# Patient Record
Sex: MALE | Race: WHITE | NOT HISPANIC OR LATINO
[De-identification: names, ages, dates, MRNs, and addresses within clinical notes are randomized per-mention and may not be internally consistent; named-entity substitution may affect disease eponyms.]

---

## 2023-05-16 PROBLEM — Z00.00 ENCOUNTER FOR PREVENTIVE HEALTH EXAMINATION: Status: ACTIVE | Noted: 2023-05-16

## 2023-11-27 ENCOUNTER — NON-APPOINTMENT (OUTPATIENT)
Age: 58
End: 2023-11-27

## 2023-11-28 ENCOUNTER — APPOINTMENT (OUTPATIENT)
Dept: NEUROLOGY | Facility: CLINIC | Age: 58
End: 2023-11-28
Payer: COMMERCIAL

## 2023-11-28 VITALS
OXYGEN SATURATION: 98 % | HEART RATE: 90 BPM | SYSTOLIC BLOOD PRESSURE: 118 MMHG | BODY MASS INDEX: 30.48 KG/M2 | WEIGHT: 225 LBS | HEIGHT: 72 IN | DIASTOLIC BLOOD PRESSURE: 70 MMHG

## 2023-11-28 DIAGNOSIS — Z78.9 OTHER SPECIFIED HEALTH STATUS: ICD-10-CM

## 2023-11-28 DIAGNOSIS — F17.290 NICOTINE DEPENDENCE, OTHER TOBACCO PRODUCT, UNCOMPLICATED: ICD-10-CM

## 2023-11-28 PROCEDURE — 99205 OFFICE O/P NEW HI 60 MIN: CPT

## 2023-12-05 ENCOUNTER — APPOINTMENT (OUTPATIENT)
Dept: RHEUMATOLOGY | Facility: CLINIC | Age: 58
End: 2023-12-05

## 2023-12-14 ENCOUNTER — TRANSCRIPTION ENCOUNTER (OUTPATIENT)
Age: 58
End: 2023-12-14

## 2023-12-17 ENCOUNTER — NON-APPOINTMENT (OUTPATIENT)
Age: 58
End: 2023-12-17

## 2023-12-27 ENCOUNTER — APPOINTMENT (OUTPATIENT)
Dept: NEUROLOGY | Facility: CLINIC | Age: 58
End: 2023-12-27
Payer: COMMERCIAL

## 2023-12-27 PROCEDURE — 99214 OFFICE O/P EST MOD 30 MIN: CPT | Mod: 95

## 2023-12-27 NOTE — DISCUSSION/SUMMARY
[FreeTextEntry1] : 58-year-old man history of celiac disease with progressive muscle stiffness, gait abnormality, arm pain, multiple falls, now with voice changes since last visit. Symptoms seem to be progressing further. 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 He recently underwent lumbar puncture.  Discussed results in detail with patient and his wife. There is elevated protein as well as IgG synthesis, IgG index, and immunoglobulins level as well as positive Mylan basic protein and positive West Nile IgG. Discussed that these findings so far are suggestive of some sort of inflammatory/autoimmune process. The positive West Nile IgG is of unclear significance at this time, however West Nile is known to cause myelitis type picture.  Given these findings I discussed starting treatments for suspected autoimmune process which will involve hospital admission for minimum of five days for IVIG. I discussed treatment options including IV IG and plasmapheresis. I am opting for IV IG at this time given the shorter time needed in the hospital as well as lower risk over plasmapheresis. We discussed that our goal is to try and get him back to function and hopefully back to work although prognosis is unknown. He will likely need long-term treatment. Patient wife agreed and they will be coming into [facility] on Sunday for admission.  Results of specific antibody testing including anti-Chandler are still pending.

## 2023-12-27 NOTE — REASON FOR VISIT
[Medical Office: ([facility])___] : at the medical office located in  [Patient] : the patient [Self] : self [Follow-Up: _____] : a [unfilled] follow-up visit [Partner] : partner [FreeTextEntry4] : Wife

## 2023-12-27 NOTE — DATA REVIEWED
[de-identified] : CSF: myelin basic protein 7.2ng/ml High, IgG index 0.8 high, IgG synthesis rate 7.0 mg/day high, zero oligoclonal bands, WEst Nile IgG positive, Protein 52.7mg/dl High

## 2023-12-27 NOTE — PHYSICAL EXAM
[FreeTextEntry1] : Gen: NAD, comfortable Resp: normal rate and rhythm HEENT: normocephalic, atraumatic, clear conjunctiva Neck: normal ROM  Neuro: MS: AOX3, speech clear and fluent, following commands CN: pupils equal and round, EOMI, V1-V3 intact to LT, no facial asymmetry hearing intact to finger rub bilat, shoulder shrug intact bilat Voice is slowed and slightly hypophonic

## 2023-12-27 NOTE — HISTORY OF PRESENT ILLNESS
[FreeTextEntry1] : Since last visit: He had the lumbar puncture 12/13/23.  He has been doing worse.  Started him on Keppra 500mg BID and Valium 5mg BID.   She had another fall today. He fell also on Dec 9th and 10th on the concrete. He has some bruising and cuts. He has been using objects and leaning on the cars to keep balance and slipped outside. He is very careful. He hasn't been back to work since the lumbar puncture. His speech is slowed, mouth feeling numb. The valium knocks him out so he only he takes at night. This is also an issue with his job because they may not allow him to be on Valium.

## 2024-01-10 ENCOUNTER — APPOINTMENT (OUTPATIENT)
Dept: NEUROLOGY | Facility: CLINIC | Age: 59
End: 2024-01-10
Payer: COMMERCIAL

## 2024-01-10 VITALS
SYSTOLIC BLOOD PRESSURE: 126 MMHG | BODY MASS INDEX: 29.8 KG/M2 | WEIGHT: 220 LBS | HEART RATE: 95 BPM | DIASTOLIC BLOOD PRESSURE: 86 MMHG | HEIGHT: 72 IN | OXYGEN SATURATION: 99 %

## 2024-01-10 DIAGNOSIS — M79.602 PAIN IN LEFT ARM: ICD-10-CM

## 2024-01-10 PROCEDURE — 99214 OFFICE O/P EST MOD 30 MIN: CPT

## 2024-01-12 ENCOUNTER — NON-APPOINTMENT (OUTPATIENT)
Age: 59
End: 2024-01-12

## 2024-01-16 NOTE — DATA REVIEWED
[de-identified] : Lab work dated 10/6/2023 A1c 5.5, D-dimer 664 T4 and T3 within normal limits testosterone 445, TSH 1.6,, vitamin B12 596, folate 8.2 LDL 82 HDL 61,

## 2024-01-16 NOTE — PHYSICAL EXAM
[General Appearance - Alert] : alert [General Appearance - In No Acute Distress] : in no acute distress [Oriented To Time, Place, And Person] : oriented to person, place, and time [Impaired Insight] : insight and judgment were intact [Affect] : the affect was normal [Person] : oriented to person [Place] : oriented to place [Time] : oriented to time [Concentration Intact] : normal concentrating ability [Visual Intact] : visual attention was ~T not ~L decreased [Naming Objects] : no difficulty naming common objects [Repeating Phrases] : no difficulty repeating a phrase [Writing A Sentence] : no difficulty writing a sentence [Fluency] : fluency intact [Comprehension] : comprehension intact [Reading] : reading intact [Past History] : adequate knowledge of personal past history [Cranial Nerves Optic (II)] : visual acuity intact bilaterally,  visual fields full to confrontation, pupils equal round and reactive to light [Cranial Nerves Oculomotor (III)] : extraocular motion intact [Cranial Nerves Trigeminal (V)] : facial sensation intact symmetrically [Cranial Nerves Facial (VII)] : face symmetrical [Cranial Nerves Vestibulocochlear (VIII)] : hearing was intact bilaterally [Cranial Nerves Glossopharyngeal (IX)] : tongue and palate midline [Cranial Nerves Accessory (XI - Cranial And Spinal)] : head turning and shoulder shrug symmetric [Cranial Nerves Hypoglossal (XII)] : there was no tongue deviation with protrusion [Motor Strength] : muscle strength was normal in all four extremities [Involuntary Movements] : no involuntary movements were seen [Motor Handedness Right-Handed] : the patient is right hand dominant [Sensation Tactile Decrease] : light touch was intact [Abnormal Walk] : normal gait [Balance] : balance was intact [3+] : Ankle jerk left 3+ [Sclera] : the sclera and conjunctiva were normal [PERRL With Normal Accommodation] : pupils were equal in size, round, reactive to light, with normal accommodation [Extraocular Movements] : extraocular movements were intact [Full Visual Field] : full visual field [Outer Ear] : the ears and nose were normal in appearance [Hearing Threshold Finger Rub Not Davis] : hearing was normal [Neck Appearance] : the appearance of the neck was normal [Respiration, Rhythm And Depth] : normal respiratory rhythm and effort [Heart Rate And Rhythm] : heart rate was normal and rhythm regular [No Spinal Tenderness] : no spinal tenderness [Nail Clubbing] : no clubbing  or cyanosis of the fingernails [Musculoskeletal - Swelling] : no joint swelling seen [Skin Color & Pigmentation] : normal skin color and pigmentation [Skin Turgor] : normal skin turgor [] : no rash [2+] : Patella right 2+ [Past-pointing] : there was no past-pointing [Tremor] : no tremor present [Plantar Reflex Right Only] : normal on the right [Plantar Reflex Left Only] : normal on the left [FreeTextEntry6] :  tone in the arms and legs is reduced compared to previous exam. Knees able to be straightened fully. left arm also able to be straightened fully.  [FreeTextEntry9] : positive prepatellar, cross adductor, pectoral DTR., reflexes reduced on the right compared to the left [FreeTextEntry1] : stiff gait

## 2024-01-16 NOTE — DISCUSSION/SUMMARY
[FreeTextEntry1] : 59 y/o man with 1-1.5 yrs of progressive  left arm pain and stiffness, bilat LE rigidity. Hyperreflexia, and increased tone on exam with preserved strength, gait abnormality. Patient with abnormal CSF studies, initially considering stiff person syndrome. However, anti-ANASTASIIA antibodies negative. Increased protein, IgG synthesis and IgG index in CSF. He has motor neuron disease which could be consistent with lebron other autoimmune syndrome, seronegative stiff person, Primary lateral sclerosis, West Nile myelitis.   - Discontinue Keppra- doesn't seem to be helping - Continue Baclofen 20mg TID - Continue Valium 5mg AM, 10mg PM - Start IVIG Gammagard 100g every 3 weeks. Benadryl 25mg and Tylenol 350mg prior to infusion - Continue steroid taper - MRI left arm to r/o adhesive capsulitis -  Physical therapy referral - Advised patient to use cane for stability - he will send over disability paperwork when he is ready.  - F/U in 3 months.   Discussed diagnosis of stiff person, results, and other differentials in detail.  All questions answered to the best of my ability.   Follow up in 3 months.

## 2024-01-16 NOTE — HISTORY OF PRESENT ILLNESS
[FreeTextEntry1] : Patient recently wasn't [facility] from 12/31 through 1/4 .  here been having worsening falls. He had five days of IV IG and Solu-Medrol. Baclofen was increased to 20 mg three times a day. Valium was also increased to 10 mg at night. He had some improvement in left upper extremity range of motion  And slurred speech. Was able to send off some CSF for ANASTASIIA testing. Sent paraneoplastic panel In serum.  He states he feels weaker throughout but that his limbs are not as stiff. He had his assessment for work but he is being strongly recommended to take disability.   Glutamic acid decarboxylase ab  is negative.  CSF results: West Nile IgG positive, IgG index 0.8, protein CSF 52.7, Mild basic protein 7.2, IgG synthesis rate 7.0 , all of which are elevated. No local formal bands. MOG negative, Quant gold negative,   Dec 27, 2023: Since last visit: He had the lumbar puncture 12/13/23. He has been doing worse. Started him on Keppra 500mg BID and Valium 5mg BID. She had another fall today. He fell also on Dec 9th and 10th on the concrete. He has some bruising and cuts. He has been using objects and leaning on the cars to keep balance and slipped outside. He is very careful. He hasn't been back to work since the lumbar puncture. His speech is slowed, mouth feeling numb. The valium knocks him out so he only he takes at night. This is also an issue with his job because they may not allow him to be on Valium.  Initial HPI:59 y/o man here for multiple neurologic complaints. Past medical history of chronic lower back pain and Celiac disease. Has been seen by for other neurology providers. Legs hard and heavy, left leg hard to move, lower back pain, unbalanced, lightheaded when standing, falling, extreme left arm pain and shoulder pain, muscle atrophy. He takes baclofen, has trouble navigating curbs because he has imbalance. Gets startles by everything (jumpy), muscle twitching left leg and arm, occasional blurry vision that comes and goes lasting a minute, mouth feels numb tight (new), sharp pain right toe then left thumb twitching. Legs are always hard as a right, started on the left but now it's affecting the right side. Has difficulty holding is bowel and bladder. Has to urgently go. No accidents. he has had multiple falls. Whenever he has to side step or backstep, he can't catch himself. He has difficulty projecting his voice, weaker than it before, gums are tight feeling. Has to drink a lot of water. The pain in the left triceps started i the past couple of months. Has pain with straightening the elbow or abduction, there has also been muscle atrophy. Works for [employer] works on the [employer], drives to different locations. No Weight loss. April 1, 2021 fell off the back of a truck onto the third rail onto his back. Took sometime to come back to work. He has herniated discs and L5 radiculopathy. He had right knee scope and was told he needed full knee replacement. He was favoring left side. Started walking funny and thought it was from the left knee. He was told that maybe he had MS as there were some T2 lesions on MRI. They repeated MRI brain to see if lesions were bigger. Saw Dr. Espino, surgery Dr. Miguel, didn't hear anything back Dr. Amezquita.  EMG with [facility] neurology evaluation of the left median motor and right median motor nerve showed prolonged distal onset latency. Left peroneal motor nerve showed reduced amplitude. Left median sensory showed prolonged distal onset latency and decreased conduction velocity. Right median sensory nerve showed prolonged distal onset latency, reduced amplitude, reduced decreased conduction velocity. All remaining nerves tested were within normal limits. Left median F wave showed prolonged latency right median F wave showed prolonged latency. Left peroneal F wave showed prolonged latency. Impression was bilateral lumbosacral radiculopathy affecting the left L5 and right S1 nerve roots. 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 EMG performed with Dr. Diggs 6/15/2023 had similar findings. Recently seen by [place] [facility] specialists for lower back pain and possible epidural steroid injection September 2023.  MRI brain 5/31/23 Arash Garay: Unremarkable brain MRI without mass or acute findings. MRA head 11/16/23: INTERNAL CAROTID ARTERIES: Normal in course, caliber, and contour. PROXIMAL ANTERIOR AND MIDDLE CEREBRAL ARTERIES: Normal flow-related enhancement ANTERIOR COMMUNICATING ARTERY: Normal VERTEBROBASILAR SYSTEM: Normal flow-related enhancement is seen within the visualized vertebral, basilar, posterior cerebral and cerebellar arteries. MR Cervical spine 9/11/23: Moderate central canal stenosis at C3-4. Mild central canal stenosis at C4-5 Severe left C6-7 foraminal narrowing. MR thoracic spine 9/08/23: Moderate right T2-3 foraminal narrowing. Mild right T9-10 foraminal narrowing. Additional milder degenerative changes as above

## 2024-02-20 ENCOUNTER — NON-APPOINTMENT (OUTPATIENT)
Age: 59
End: 2024-02-20

## 2024-03-20 DIAGNOSIS — M75.100 UNSPECIFIED ROTATOR CUFF TEAR OR RUPTURE OF UNSPECIFIED SHOULDER, NOT SPECIFIED AS TRAUMATIC: ICD-10-CM

## 2024-03-22 PROBLEM — M75.100 ROTATOR CUFF TEAR: Status: ACTIVE | Noted: 2024-03-22

## 2024-03-31 RX ORDER — LEVETIRACETAM 500 MG/1
500 TABLET, FILM COATED ORAL
Qty: 180 | Refills: 2 | Status: DISCONTINUED | COMMUNITY
Start: 2023-12-20 | End: 2024-03-31

## 2024-04-01 ENCOUNTER — APPOINTMENT (OUTPATIENT)
Dept: NEUROLOGY | Facility: CLINIC | Age: 59
End: 2024-04-01
Payer: COMMERCIAL

## 2024-04-01 VITALS
SYSTOLIC BLOOD PRESSURE: 121 MMHG | BODY MASS INDEX: 30.48 KG/M2 | HEART RATE: 77 BPM | DIASTOLIC BLOOD PRESSURE: 85 MMHG | OXYGEN SATURATION: 97 % | WEIGHT: 225 LBS | HEIGHT: 72 IN

## 2024-04-01 PROCEDURE — 99214 OFFICE O/P EST MOD 30 MIN: CPT

## 2024-04-05 NOTE — PHYSICAL EXAM
[Oriented To Time, Place, And Person] : oriented to person, place, and time [General Appearance - Alert] : alert [General Appearance - In No Acute Distress] : in no acute distress [Impaired Insight] : insight and judgment were intact [Affect] : the affect was normal [Person] : oriented to person [Place] : oriented to place [Time] : oriented to time [Concentration Intact] : normal concentrating ability [Naming Objects] : no difficulty naming common objects [Visual Intact] : visual attention was ~T not ~L decreased [Writing A Sentence] : no difficulty writing a sentence [Repeating Phrases] : no difficulty repeating a phrase [Fluency] : fluency intact [Reading] : reading intact [Comprehension] : comprehension intact [Past History] : adequate knowledge of personal past history [Cranial Nerves Optic (II)] : visual acuity intact bilaterally,  visual fields full to confrontation, pupils equal round and reactive to light [Cranial Nerves Trigeminal (V)] : facial sensation intact symmetrically [Cranial Nerves Oculomotor (III)] : extraocular motion intact [Cranial Nerves Vestibulocochlear (VIII)] : hearing was intact bilaterally [Cranial Nerves Facial (VII)] : face symmetrical [Cranial Nerves Glossopharyngeal (IX)] : tongue and palate midline [Cranial Nerves Hypoglossal (XII)] : there was no tongue deviation with protrusion [Cranial Nerves Accessory (XI - Cranial And Spinal)] : head turning and shoulder shrug symmetric [Involuntary Movements] : no involuntary movements were seen [Motor Strength] : muscle strength was normal in all four extremities [Motor Handedness Right-Handed] : the patient is right hand dominant [Sensation Tactile Decrease] : light touch was intact [Abnormal Walk] : normal gait [Balance] : balance was intact [2+] : Patella right 2+ [PERRL With Normal Accommodation] : pupils were equal in size, round, reactive to light, with normal accommodation [Sclera] : the sclera and conjunctiva were normal [Full Visual Field] : full visual field [Extraocular Movements] : extraocular movements were intact [Outer Ear] : the ears and nose were normal in appearance [Neck Appearance] : the appearance of the neck was normal [Hearing Threshold Finger Rub Not [place]] : hearing was normal [Respiration, Rhythm And Depth] : normal respiratory rhythm and effort [Heart Rate And Rhythm] : heart rate was normal and rhythm regular [No Spinal Tenderness] : no spinal tenderness [Nail Clubbing] : no clubbing  or cyanosis of the fingernails [Musculoskeletal - Swelling] : no joint swelling seen [Skin Color & Pigmentation] : normal skin color and pigmentation [] : no rash [Skin Turgor] : normal skin turgor [+4] : hip flexion +4/5 [4] : hip flexion 4/5 [5] : ankle plantar flexion 5/5 [3+] : Patella right 3+ [4+] : Ankle jerk left 4+ [___] : [unfilled] ~Ubeats present on the right [___] : [unfilled] ~Ubeats present on the left [Motor Strength Shoulders Right Weakness] : normal shoulder strength [Motor Strength Upper Extremities Right] : normal arm strength [Motor Strength Forearms Right Weakness] : normal forearm strength [Hand Weakness Right] : normal hand  [Motor Strength Shoulders Left Weakness] : normal shoulder strength [Motor Strength Upper Extremities Left] : normal arm strength [Motor Strength Forearms Left Weakness] : normal forearm strength [Hand Weakness Left] : normal hand  [Past-pointing] : there was no past-pointing [Tremor] : no tremor present [Plantar Reflex Right Only] : normal on the right [Plantar Reflex Left Only] : normal on the left [FreeTextEntry6] : Increased tone in the LUE, bilateral LEs, unable to straighten knees fully. About the same as previous exam.  [FreeTextEntry9] : positive prepatellar, cross adductor, pectoral DTR., reflexes increased on the left compared to the right [FreeTextEntry1] : stiff gait

## 2024-04-05 NOTE — DISCUSSION/SUMMARY
[FreeTextEntry1] : 57 y/o man with 1-1.5 yrs of progressive  left arm pain and stiffness, bilat LE rigidity. Hyperreflexia, and increased tone on exam with preserved strength, gait abnormality. Patient with abnormal CSF studies, initially considering stiff person syndrome. However, anti-ANASTASIIA antibodies negative. Increased protein, IgG synthesis and IgG index in CSF. 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 His strength has been preserved with Physical therapy and IVIG infusion, Limb rigidity is about the same but reflexes increased on the left side. His MRI of the left shoulder showed minimal earing of the infraspinatus and supraspinatus.    - Continue Baclofen 20mg TID - Continue Valium 5mg AM, 10mg PM, will try to decrease to once a day at night and see how he does.  - Continue IVIG Gammagard 100g every 3 weeks. Benadryl 25mg and Tylenol 350mg prior to infusion - Will consider tizanidine at next visit.   -  Physical therapy referral - Advised patient to use cane for stability - F/U in 5 months.    All questions answered to the best of my ability.

## 2024-04-05 NOTE — HISTORY OF PRESENT ILLNESS
[FreeTextEntry1] : Since last visit: He has been doing physical therapy regularly, does aqua therapy 3 days a week.  Complaining of mid sternum pain , worse with lying down and relieved by sitting up. Does occur all the  time.  has IVIG infusion this Friday. he has to hold onto things when he walks. he hasn't fallen since he started the PT. He crashed into his tool box while carrying something yesterday.  Current meds: baclofen three times a day . Valium 5mg BID.   Jan 16, 2024: Patient recently was at [facility] from 12/31 through 1/4 .  here been having worsening falls. He had five days of IV IG and Solu-Medrol. Baclofen was increased to 20 mg three times a day. Valium was also increased to 10 mg at night. He had some improvement in left upper extremity range of motion  And slurred speech. Was able to send off some CSF for ANASTASIIA testing. Sent paraneoplastic panel In serum.  He states he feels weaker throughout but that his limbs are not as stiff. He had his assessment for work but he is being strongly recommended to take disability.   Glutamic acid decarboxylase ab  is negative.  CSF results: West Nile IgG positive, IgG index 0.8, protein CSF 52.7, Mild basic protein 7.2, IgG synthesis rate 7.0 , all of which are elevated. No local formal bands. MOG negative, Quant gold negative,   Dec 27, 2023: Since last visit: He had the lumbar puncture 12/13/23. He has been doing worse. Started him on Keppra 500mg BID and Valium 5mg BID. She had another fall today. He fell also on Dec 9th and 10th on the concrete. He has some bruising and cuts. He has been using objects and leaning on the cars to keep balance and slipped outside. He is very careful. He hasn't been back to work since the lumbar puncture. His speech is slowed, mouth feeling numb. The valium knocks him out so he only he takes at night. This is also an issue with his job because they may not allow him to be on Valium.  Initial HPI:59 y/o man here for multiple neurologic complaints. Past medical history of chronic lower back pain and Celiac disease. Has been seen by for other neurology providers. Legs hard and heavy, left leg hard to move, lower back pain, unbalanced, lightheaded when standing, falling, extreme left arm pain and shoulder pain, muscle atrophy. He takes baclofen, has trouble navigating curbs because he has imbalance. Gets startles by everything (jumpy), muscle twitching left leg and arm, occasional blurry vision that comes and goes lasting a minute, mouth feels numb tight (new), sharp pain right toe then left thumb twitching. Legs are always hard as a right, started on the left but now it's affecting the right side. Has difficulty holding is bowel and bladder. Has to urgently go. No accidents. he has had multiple falls. Whenever he has to side step or backstep, he can't catch himself. He has difficulty projecting his voice, weaker than it before, gums are tight feeling. Has to drink a lot of water. The pain in the left triceps started i the past couple of months. Has pain with straightening the elbow or abduction, there has also been muscle atrophy. Works for [employer] works on the [employer], drives to different locations. No Weight loss. April 1, 2021 fell off the back of a truck onto the third rail onto his back. Took sometime to come back to work. He has herniated discs and L5 radiculopathy. He had right knee scope and was told he needed full knee replacement. He was favoring left side. Started walking funny and thought it was from the left knee. He was told that maybe he had MS as there were some T2 lesions on MRI. They repeated MRI brain to see if lesions were bigger. Saw Dr. Espino, surgery Dr. Miguel, didn't hear anything back Dr. Amezquita.  EMG with [facility] neurolog[facility] evaluation of the left median motor and right median motor nerve showed prolonged distal onset latency. Left peroneal motor nerve showed reduced amplitude. Left median sensory showed prolonged distal onset latency and decreased conduction velocity. Right median sensory nerve showed prolonged distal onset latency, reduced amplitude, reduced decreased conduction velocity. All remaining nerves tested were within normal limits. Left median F wave showed prolonged latency right median F wave showed prolonged latency. Left peroneal F wave showed prolonged latency. Impression was bilateral lumbosacral radiculopathy affecting the left L5 and right S1 nerve roots. 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 EMG performed with Dr. Diggs 6/15/2023 had similar findings. Recently seen by [place] [facility] specialists for lower back pain and possible epidural steroid injection September 2023.  MRI brain 5/31/23 [facility]: Unremarkable brain MRI without mass or acute findings. MRA head 11/16/23: INTERNAL CAROTID ARTERIES: Normal in course, caliber, and contour. PROXIMAL ANTERIOR AND MIDDLE CEREBRAL ARTERIES: Normal flow-related enhancement ANTERIOR COMMUNICATING ARTERY: Normal VERTEBROBASILAR SYSTEM: Normal flow-related enhancement is seen within the visualized vertebral, basilar, posterior cerebral and cerebellar arteries. MR Cervical spine 9/11/23: Moderate central canal stenosis at C3-4. Mild central canal stenosis at C4-5 Severe left C6-7 foraminal narrowing. MR thoracic spine 9/08/23: Moderate right T2-3 foraminal narrowing. Mild right T9-10 foraminal narrowing. Additional milder degenerative changes as above

## 2024-04-05 NOTE — DATA REVIEWED
[de-identified] : Lab work dated 10/6/2023 A1c 5.5, D-dimer 664 T4 and T3 within normal limits testosterone 445, TSH 1.6,, vitamin B12 596, folate 8.2 LDL 82 HDL 61,  MRI shoulder : moderate arthrosis of acromioclavicular joint. Mild supraspinatus and infraspinatus tendinosus and low grade tear articular surface tear of the infraspinatus and intermediate grade partial thickness tear of the anterior supraspinatus tendon.

## 2024-04-07 ENCOUNTER — RX RENEWAL (OUTPATIENT)
Age: 59
End: 2024-04-07

## 2024-04-07 RX ORDER — BACLOFEN 20 MG/1
20 TABLET ORAL
Qty: 120 | Refills: 2 | Status: ACTIVE | COMMUNITY
Start: 1900-01-01 | End: 1900-01-01

## 2024-05-06 ENCOUNTER — APPOINTMENT (OUTPATIENT)
Dept: NEUROLOGY | Facility: CLINIC | Age: 59
End: 2024-05-06
Payer: COMMERCIAL

## 2024-05-06 VITALS
WEIGHT: 225 LBS | DIASTOLIC BLOOD PRESSURE: 84 MMHG | OXYGEN SATURATION: 97 % | SYSTOLIC BLOOD PRESSURE: 132 MMHG | HEART RATE: 74 BPM | BODY MASS INDEX: 30.48 KG/M2 | HEIGHT: 72 IN

## 2024-05-06 DIAGNOSIS — R29.2 ABNORMAL REFLEX: ICD-10-CM

## 2024-05-06 DIAGNOSIS — G25.82 STIFF-MAN SYNDROME: ICD-10-CM

## 2024-05-06 DIAGNOSIS — M62.89 OTHER SPECIFIED DISORDERS OF MUSCLE: ICD-10-CM

## 2024-05-06 DIAGNOSIS — G12.20 MOTOR NEURON DISEASE, UNSPECIFIED: ICD-10-CM

## 2024-05-06 PROCEDURE — 99214 OFFICE O/P EST MOD 30 MIN: CPT

## 2024-05-06 PROCEDURE — G2211 COMPLEX E/M VISIT ADD ON: CPT

## 2024-05-06 RX ORDER — RILUZOLE 50 MG/1
50 TABLET, FILM COATED ORAL
Qty: 60 | Refills: 3 | Status: ACTIVE | COMMUNITY
Start: 2024-05-06 | End: 1900-01-01

## 2024-05-06 RX ORDER — IMMUNE GLOBULIN INFUSION (HUMAN) 100 MG/ML
20 INJECTION, SOLUTION INTRAVENOUS; SUBCUTANEOUS
Qty: 5 | Refills: 4 | Status: DISCONTINUED | COMMUNITY
Start: 2024-01-10 | End: 2024-05-06

## 2024-05-06 NOTE — HISTORY OF PRESENT ILLNESS
[FreeTextEntry1] : Since last visit:  IVIG was denied even though it was appealed twice. His physical therapy was also denied. He is renting out the pool so that he can still do his therapy by himself. This has helped him to maintain his strength and mobility. They are in the process of getting his MCC and disability. He had three falls, one where he fell backwards into the coffee table, another where she fell into the car and hit his head against the dash. No serious injuries.   Current meds: baclofen three times a day. Valium 5mg BID.   Jan 16, 2024: Patient recently was at [facility] from 12/31 through 1/4.  here been having worsening falls. He had five days of IV IG and Solu-Medrol. Baclofen was increased to 20 mg three times a day. Valium was also increased to 10 mg at night. He had some improvement in left upper extremity range of motion  And slurred speech. Was able to send off some CSF for ANASTASIIA testing. Sent paraneoplastic panel In serum.  He states he feels weaker throughout but that his limbs are not as stiff. He had his assessment for work but he is being strongly recommended to take disability.   Glutamic acid decarboxylase ab  is negative.  CSF results: West Nile IgG positive, IgG index 0.8, protein CSF 52.7, Mild basic protein 7.2, IgG synthesis rate 7.0 , all of which are elevated. No local formal bands. MOG negative, Quant gold negative,   Dec 27, 2023: Since last visit: He had the lumbar puncture 12/13/23. He has been doing worse. Started him on Keppra 500mg BID and Valium 5mg BID. She had another fall today. He fell also on Dec 9th and 10th on the concrete. He has some bruising and cuts. He has been using objects and leaning on the cars to keep balance and slipped outside. He is very careful. He hasn't been back to work since the lumbar puncture. His speech is slowed, mouth feeling numb. The valium knocks him out so he only he takes at night. This is also an issue with his job because they may not allow him to be on Valium.  Initial HPI:59 y/o man here for multiple neurologic complaints. Past medical history of chronic lower back pain and Celiac disease. Has been seen by for other neurology providers. Legs hard and heavy, left leg hard to move, lower back pain, unbalanced, lightheaded when standing, falling, extreme left arm pain and shoulder pain, muscle atrophy. He takes baclofen, has trouble navigating curbs because he has imbalance. Gets startles by everything (jumpy), muscle twitching left leg and arm, occasional blurry vision that comes and goes lasting a minute, mouth feels numb tight (new), sharp pain right toe then left thumb twitching. Legs are always hard as a right, started on the left but now it's affecting the right side. Has difficulty holding is bowel and bladder. Has to urgently go. No accidents. he has had multiple falls. Whenever he has to side step or backstep, he can't catch himself. He has difficulty projecting his voice, weaker than it before, gums are tight feeling. Has to drink a lot of water. The pain in the left triceps started i the past couple of months. Has pain with straightening the elbow or abduction, there has also been muscle atrophy. Works for [employer] works on the [employer], drives to different locations. No Weight loss. April 1, 2021 fell off the back of a truck onto the third rail onto his back. Took sometime to come back to work. He has herniated discs and L5 radiculopathy. He had right knee scope and was told he needed full knee replacement. He was favoring left side. Started walking funny and thought it was from the left knee. He was told that maybe he had MS as there were some T2 lesions on MRI. They repeated MRI brain to see if lesions were bigger. Saw Dr. Espino, surgery Dr. Miguel, didn't hear anything back Dr. Amezquita.  EMG with [facility] evaluation of the left median motor and right median motor nerve showed prolonged distal onset latency. Left peroneal motor nerve showed reduced amplitude. Left median sensory showed prolonged distal onset latency and decreased conduction velocity. Right median sensory nerve showed prolonged distal onset latency, reduced amplitude, reduced decreased conduction velocity. All remaining nerves tested were within normal limits. Left median F wave showed prolonged latency right median F wave showed prolonged latency. Left peroneal F wave showed prolonged latency. Impression was bilateral lumbosacral radiculopathy affecting the left L5 and right S1 nerve roots. 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 EMG performed with Dr. Diggs 6/15/2023 had similar findings. Recently seen by [place] neuroscience specialists for lower back pain and possible epidural steroid injection September 2023.  MRI brain 5/31/23 [facility] Sal[facility]: Unremarkable brain MRI without mass or acute findings. MRA head 11/16/23: INTERNAL CAROTID ARTERIES: Normal in course, caliber, and contour. PROXIMAL ANTERIOR AND MIDDLE CEREBRAL ARTERIES: Normal flow-related enhancement ANTERIOR COMMUNICATING ARTERY: Normal VERTEBROBASILAR SYSTEM: Normal flow-related enhancement is seen within the visualized vertebral, basilar, posterior cerebral and cerebellar arteries. MR Cervical spine 9/11/23: Moderate central canal stenosis at C3-4. Mild central canal stenosis at C4-5 Severe left C6-7 foraminal narrowing. MR thoracic spine 9/08/23: Moderate right T2-3 foraminal narrowing. Mild right T9-10 foraminal narrowing. Additional milder degenerative changes as above

## 2024-05-06 NOTE — PHYSICAL EXAM
[General Appearance - Alert] : alert [General Appearance - In No Acute Distress] : in no acute distress [Oriented To Time, Place, And Person] : oriented to person, place, and time [Impaired Insight] : insight and judgment were intact [Affect] : the affect was normal [Person] : oriented to person [Place] : oriented to place [Time] : oriented to time [Concentration Intact] : normal concentrating ability [Visual Intact] : visual attention was ~T not ~L decreased [Naming Objects] : no difficulty naming common objects [Repeating Phrases] : no difficulty repeating a phrase [Writing A Sentence] : no difficulty writing a sentence [Fluency] : fluency intact [Comprehension] : comprehension intact [Reading] : reading intact [Past History] : adequate knowledge of personal past history [Cranial Nerves Optic (II)] : visual acuity intact bilaterally,  visual fields full to confrontation, pupils equal round and reactive to light [Cranial Nerves Oculomotor (III)] : extraocular motion intact [Cranial Nerves Trigeminal (V)] : facial sensation intact symmetrically [Cranial Nerves Facial (VII)] : face symmetrical [Cranial Nerves Vestibulocochlear (VIII)] : hearing was intact bilaterally [Cranial Nerves Glossopharyngeal (IX)] : tongue and palate midline [Cranial Nerves Accessory (XI - Cranial And Spinal)] : head turning and shoulder shrug symmetric [Cranial Nerves Hypoglossal (XII)] : there was no tongue deviation with protrusion [Motor Strength] : muscle strength was normal in all four extremities [Involuntary Movements] : no involuntary movements were seen [Motor Handedness Right-Handed] : the patient is right hand dominant [+4] : hip flexion +4/5 [4] : hip flexion 4/5 [5] : ankle plantar flexion 5/5 [Sensation Tactile Decrease] : light touch was intact [Abnormal Walk] : normal gait [Balance] : balance was intact [2+] : Brachioradialis right 2+ [3+] : Patella right 3+ [4+] : Ankle jerk left 4+ [___] : [unfilled] ~Ubeats present on the right [___] : [unfilled] ~Ubeats present on the left [Sclera] : the sclera and conjunctiva were normal [PERRL With Normal Accommodation] : pupils were equal in size, round, reactive to light, with normal accommodation [Extraocular Movements] : extraocular movements were intact [Full Visual Field] : full visual field [Outer Ear] : the ears and nose were normal in appearance [Hearing Threshold Finger Rub Not Mitchell] : hearing was normal [Neck Appearance] : the appearance of the neck was normal [Respiration, Rhythm And Depth] : normal respiratory rhythm and effort [Heart Rate And Rhythm] : heart rate was normal and rhythm regular [No Spinal Tenderness] : no spinal tenderness [Nail Clubbing] : no clubbing  or cyanosis of the fingernails [Musculoskeletal - Swelling] : no joint swelling seen [Skin Color & Pigmentation] : normal skin color and pigmentation [Skin Turgor] : normal skin turgor [] : no rash [Motor Strength Shoulders Right Weakness] : normal shoulder strength [Motor Strength Upper Extremities Right] : normal arm strength [Motor Strength Forearms Right Weakness] : normal forearm strength [Hand Weakness Right] : normal hand  [Motor Strength Shoulders Left Weakness] : normal shoulder strength [Motor Strength Upper Extremities Left] : normal arm strength [Motor Strength Forearms Left Weakness] : normal forearm strength [Hand Weakness Left] : normal hand  [Past-pointing] : there was no past-pointing [Tremor] : no tremor present [Plantar Reflex Right Only] : normal on the right [Plantar Reflex Left Only] : normal on the left [FreeTextEntry6] : Increased tone in the LUE, bilateral LEs, able to straighten knees fully with significant effort, LUE help at 120 degrees but able to fully extend to 180 degrees with effort.  negative retropulsion. [FreeTextEntry9] : positive prepatellar, cross adductor, pectoral DTR., reflexes increased on the left compared to the right [FreeTextEntry1] : stiff gait

## 2024-05-06 NOTE — DATA REVIEWED
[de-identified] : Lab work dated 10/6/2023 A1c 5.5, D-dimer 664 T4 and T3 within normal limits testosterone 445, TSH 1.6,, vitamin B12 596, folate 8.2 LDL 82 HDL 61,  MRI shoulder : moderate arthrosis of acromioclavicular joint. Mild supraspinatus and infraspinatus tendinosus and low grade tear articular surface tear of the infraspinatus and intermediate grade partial thickness tear of the anterior supraspinatus tendon.

## 2024-05-06 NOTE — DISCUSSION/SUMMARY
[FreeTextEntry1] : 57 y/o man with 1-1.5 yrs of progressive  left arm pain and stiffness, bilat LE rigidity. Hyperreflexia, and increased tone on exam with preserved strength, gait abnormality. Patient with abnormal CSF studies, initially considering stiff person syndrome. However, anti-ANASTASIIA antibodies negative. Increased protein, IgG synthesis and IgG index in CSF. 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 His strength has been preserved with Physical therapy and IVIG infusion, Limb rigidity is about the same but reflexes increased on the left side. His MRI of the left shoulder showed minimal earing of the infraspinatus and supraspinatus.    - Continue Baclofen 20mg TID - Continue Valium 5mg AM, 10mg PM,  refill sent -  Will discuss role for other treatments such as riluzole with neuromuscular specialist - Continue with his regular swim therapy. Seems to be helping with ROM and strength and keeping his mobility.  - Will consider physiatry referral if spasticity worsens for potential Botox.  - Advised patient to use cane for stability - F/U in August.    All questions answered to the best of my ability.

## 2024-05-16 ENCOUNTER — NON-APPOINTMENT (OUTPATIENT)
Age: 59
End: 2024-05-16

## 2024-06-11 RX ORDER — DIAZEPAM 5 MG/1
5 TABLET ORAL
Qty: 60 | Refills: 0 | Status: ACTIVE | COMMUNITY
Start: 2023-12-20 | End: 1900-01-01

## 2024-08-29 ENCOUNTER — APPOINTMENT (OUTPATIENT)
Dept: NEUROLOGY | Facility: CLINIC | Age: 59
End: 2024-08-29
Payer: COMMERCIAL

## 2024-08-29 VITALS
HEART RATE: 81 BPM | SYSTOLIC BLOOD PRESSURE: 152 MMHG | WEIGHT: 215 LBS | BODY MASS INDEX: 29.12 KG/M2 | DIASTOLIC BLOOD PRESSURE: 91 MMHG | OXYGEN SATURATION: 97 % | HEIGHT: 72 IN

## 2024-08-29 DIAGNOSIS — R76.0 STIFF-MAN SYNDROME: ICD-10-CM

## 2024-08-29 DIAGNOSIS — M62.838 OTHER MUSCLE SPASM: ICD-10-CM

## 2024-08-29 DIAGNOSIS — G25.82 STIFF-MAN SYNDROME: ICD-10-CM

## 2024-08-29 PROCEDURE — 99214 OFFICE O/P EST MOD 30 MIN: CPT

## 2024-08-29 NOTE — PHYSICAL EXAM
[General Appearance - Alert] : alert [General Appearance - In No Acute Distress] : in no acute distress [Oriented To Time, Place, And Person] : oriented to person, place, and time [Impaired Insight] : insight and judgment were intact [Affect] : the affect was normal [Person] : oriented to person [Place] : oriented to place [Time] : oriented to time [Concentration Intact] : normal concentrating ability [Visual Intact] : visual attention was ~T not ~L decreased [Naming Objects] : no difficulty naming common objects [Repeating Phrases] : no difficulty repeating a phrase [Writing A Sentence] : no difficulty writing a sentence [Fluency] : fluency intact [Comprehension] : comprehension intact [Reading] : reading intact [Past History] : adequate knowledge of personal past history [Cranial Nerves Optic (II)] : visual acuity intact bilaterally,  visual fields full to confrontation, pupils equal round and reactive to light [Cranial Nerves Oculomotor (III)] : extraocular motion intact [Cranial Nerves Trigeminal (V)] : facial sensation intact symmetrically [Cranial Nerves Facial (VII)] : face symmetrical [Cranial Nerves Vestibulocochlear (VIII)] : hearing was intact bilaterally [Cranial Nerves Glossopharyngeal (IX)] : tongue and palate midline [Cranial Nerves Accessory (XI - Cranial And Spinal)] : head turning and shoulder shrug symmetric [Cranial Nerves Hypoglossal (XII)] : there was no tongue deviation with protrusion [Involuntary Movements] : no involuntary movements were seen [Motor Strength] : muscle strength was normal in all four extremities [Motor Handedness Right-Handed] : the patient is right hand dominant [+4] : hip flexion +4/5 [4] : hip flexion 4/5 [5] : ankle plantar flexion 5/5 [Sensation Tactile Decrease] : light touch was intact [Abnormal Walk] : normal gait [Balance] : balance was intact [2+] : Brachioradialis right 2+ [3+] : Patella right 3+ [4+] : Ankle jerk left 4+ [___] : [unfilled] ~Ubeats present on the right [___] : [unfilled] ~Ubeats present on the left [Sclera] : the sclera and conjunctiva were normal [PERRL With Normal Accommodation] : pupils were equal in size, round, reactive to light, with normal accommodation [Extraocular Movements] : extraocular movements were intact [Full Visual Field] : full visual field [Respiration, Rhythm And Depth] : normal respiratory rhythm and effort [No Spinal Tenderness] : no spinal tenderness [Nail Clubbing] : no clubbing  or cyanosis of the fingernails [Musculoskeletal - Swelling] : no joint swelling seen [Skin Color & Pigmentation] : normal skin color and pigmentation [Skin Turgor] : normal skin turgor [] : no rash [Motor Strength Shoulders Right Weakness] : normal shoulder strength [Motor Strength Upper Extremities Right] : normal arm strength [Motor Strength Forearms Right Weakness] : normal forearm strength [Hand Weakness Right] : normal hand  [Motor Strength Upper Extremities Left] : normal arm strength [Motor Strength Shoulders Left Weakness] : normal shoulder strength [Motor Strength Forearms Left Weakness] : normal forearm strength [Hand Weakness Left] : normal hand  [Past-pointing] : there was no past-pointing [Tremor] : no tremor present [Plantar Reflex Right Only] : normal on the right [Plantar Reflex Left Only] : normal on the left [FreeTextEntry6] : Increased tone in the LUE, bilateral LEs, able to straighten knees fully with significant effort, LUE held at 90 degrees but able to fully extend to 180 degrees with significant effort.  [FreeTextEntry9] : positive prepatellar, cross adductor, pectoral DTR., reflexes increased on the left compared to the right, even more brisk today.  [FreeTextEntry1] : stiff gait

## 2024-08-29 NOTE — DISCUSSION/SUMMARY
[FreeTextEntry1] : 57 y/o man with 1-1.5 yrs of progressive  left arm pain and stiffness, bilat LE rigidity. Hyperreflexia, and increased tone on exam with preserved strength, gait abnormality. He was seen by Dr. Pickering at [facility], and anti-ANASTASIIA antibodies came back as positive. He has been getting IVIG infusions and recently started on Rituximab. Plan to infuse 3 doses.  His strength has been preserved with Physical therapy. His spasticity and gait have worsened.    - Continue Baclofen 20mg TID - Continue Valium 5mg AM, 10mg PM,  refill sent -  continue riluzole 50mg daily - Continue with his regular swim therapy. Seems to be helping with ROM and strength and keeping his mobility.  - Botox for spasticity of the LUE. Valium and baclofen giving limited improvement. This will help provide better use of the left arm. - Advised patient to use cane for stability - F/U after third dose of Rituximab   All questions answered to the best of my ability.

## 2024-08-29 NOTE — DATA REVIEWED
[de-identified] : Lab work dated 10/6/2023 A1c 5.5, D-dimer 664 T4 and T3 within normal limits testosterone 445, TSH 1.6,, vitamin B12 596, folate 8.2 LDL 82 HDL 61,  MRI shoulder : moderate arthrosis of acromioclavicular joint. Mild supraspinatus and infraspinatus tendinosus and low grade tear articular surface tear of the infraspinatus and intermediate grade partial thickness tear of the anterior supraspinatus tendon.  Attending MD Tova Joy:  I personally have seen and examined this patient.  Resident note reviewed and agree on plan of care and except where noted.  See HPI, PE, and MDM for details.

## 2024-08-29 NOTE — HISTORY OF PRESENT ILLNESS
[FreeTextEntry1] : Today Aug 29: Since last visit he started seeing another neurologist, Dr. Pickering who retested for the ANASTASIIA antibody and it was found to be positive. He was able to start IVIG therapy but now they are planning on starting him on Rituximab. He started it yesterday, next is in 2 weeks, then in 3 months. He has been on the IVIG he has noticed his memory has not been as good. he also had nuclear stress test which was good. he has fallen several times at home. He had a bad fall recently when he went shopping in [employer]. He was hit by the door and was knocked backwards with the cart on top of him. He still has poor control when he is walking.   May 06:  IVIG was denied even though it was appealed twice. His physical therapy was also denied. He is renting out the pool so that he can still do his therapy by himself. This has helped him to maintain his strength and mobility. They are in the process of getting his senior care and disability. He had three falls, one where he fell backwards into the coffee table, another where she fell into the car and hit his head against the dash. No serious injuries.   Current meds: baclofen three times a day. Valium 5mg BID.   Jan 16, 2024: Patient recently was at [facility] from 12/31 through 1/4.  here been having worsening falls. He had five days of IV IG and Solu-Medrol. Baclofen was increased to 20 mg three times a day. Valium was also increased to 10 mg at night. He had some improvement in left upper extremity range of motion  And slurred speech. Was able to send off some CSF for ANASTASIIA testing. Sent paraneoplastic panel In serum.  He states he feels weaker throughout but that his limbs are not as stiff. He had his assessment for work but he is being strongly recommended to take disability.   Glutamic acid decarboxylase ab  is negative.  CSF results: West Nile IgG positive, IgG index 0.8, protein CSF 52.7, Mild basic protein 7.2, IgG synthesis rate 7.0 , all of which are elevated. No local formal bands. MOG negative, Quant gold negative,   Dec 27, 2023: Since last visit: He had the lumbar puncture 12/13/23. He has been doing worse. Started him on Keppra 500mg BID and Valium 5mg BID. She had another fall today. He fell also on Dec 9th and 10th on the concrete. He has some bruising and cuts. He has been using objects and leaning on the cars to keep balance and slipped outside. He is very careful. He hasn't been back to work since the lumbar puncture. His speech is slowed, mouth feeling numb. The valium knocks him out so he only he takes at night. This is also an issue with his job because they may not allow him to be on Valium.  Initial HPI:59 y/o man here for multiple neurologic complaints. Past medical history of chronic lower back pain and Celiac disease. Has been seen by for other neurology providers. Legs hard and heavy, left leg hard to move, lower back pain, unbalanced, lightheaded when standing, falling, extreme left arm pain and shoulder pain, muscle atrophy. He takes baclofen, has trouble navigating curbs because he has imbalance. Gets startles by everything (jumpy), muscle twitching left leg and arm, occasional blurry vision that comes and goes lasting a minute, mouth feels numb tight (new), sharp pain right toe then left thumb twitching. Legs are always hard as a right, started on the left but now it's affecting the right side. Has difficulty holding is bowel and bladder. Has to urgently go. No accidents. he has had multiple falls. Whenever he has to side step or backstep, he can't catch himself. He has difficulty projecting his voice, weaker than it before, gums are tight feeling. Has to drink a lot of water. The pain in the left triceps started i the past couple of months. Has pain with straightening the elbow or abduction, there has also been muscle atrophy. Works for [employer] works on the [employer], drives to different locations. No Weight loss. April 1, 2021 fell off the back of a truck onto the third rail onto his back. Took sometime to come back to work. He has herniated discs and L5 radiculopathy. He had right knee scope and was told he needed full knee replacement. He was favoring left side. Started walking funny and thought it was from the left knee. He was told that maybe he had MS as there were some T2 lesions on MRI. They repeated MRI brain to see if lesions were bigger. Saw Dr. Espino, surgery Dr. Miguel, didn't hear anything back Dr. Amezquita.  EMG with [facility] evaluation of the left median motor and right median motor nerve showed prolonged distal onset latency. Left peroneal motor nerve showed reduced amplitude. Left median sensory showed prolonged distal onset latency and decreased conduction velocity. Right median sensory nerve showed prolonged distal onset latency, reduced amplitude, reduced decreased conduction velocity. All remaining nerves tested were within normal limits. Left median F wave showed prolonged latency right median F wave showed prolonged latency. Left peroneal F wave showed prolonged latency. Impression was bilateral lumbosacral radiculopathy affecting the left L5 and right S1 nerve roots. 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 EMG performed with Dr. Diggs 6/15/2023 had similar findings. Recently seen by [place] [facility] specialists for lower back pain and possible epidural steroid injection September 2023.  MRI brain 5/31/23 [facility]: Unremarkable brain MRI without mass or acute findings. MRA head 11/16/23: INTERNAL CAROTID ARTERIES: Normal in course, caliber, and contour. PROXIMAL ANTERIOR AND MIDDLE CEREBRAL ARTERIES: Normal flow-related enhancement ANTERIOR COMMUNICATING ARTERY: Normal VERTEBROBASILAR SYSTEM: Normal flow-related enhancement is seen within the visualized vertebral, basilar, posterior cerebral and cerebellar arteries. MR Cervical spine 9/11/23: Moderate central canal stenosis at C3-4. Mild central canal stenosis at C4-5 Severe left C6-7 foraminal narrowing. MR thoracic spine 9/08/23: Moderate right T2-3 foraminal narrowing. Mild right T9-10 foraminal narrowing. Additional milder degenerative changes as above

## 2025-01-17 ENCOUNTER — RX RENEWAL (OUTPATIENT)
Age: 60
End: 2025-01-17

## 2025-02-24 ENCOUNTER — RX RENEWAL (OUTPATIENT)
Age: 60
End: 2025-02-24

## 2025-03-31 ENCOUNTER — RX RENEWAL (OUTPATIENT)
Age: 60
End: 2025-03-31

## 2025-05-21 ENCOUNTER — RX RENEWAL (OUTPATIENT)
Age: 60
End: 2025-05-21

## 2025-07-30 ENCOUNTER — RX RENEWAL (OUTPATIENT)
Age: 60
End: 2025-07-30

## 2025-08-04 ENCOUNTER — RX RENEWAL (OUTPATIENT)
Age: 60
End: 2025-08-04

## 2025-09-09 ENCOUNTER — RX RENEWAL (OUTPATIENT)
Age: 60
End: 2025-09-09